# Patient Record
Sex: FEMALE | Race: WHITE
[De-identification: names, ages, dates, MRNs, and addresses within clinical notes are randomized per-mention and may not be internally consistent; named-entity substitution may affect disease eponyms.]

---

## 2018-04-12 ENCOUNTER — HOSPITAL ENCOUNTER (EMERGENCY)
Dept: HOSPITAL 80 - FED | Age: 28
Discharge: HOME | End: 2018-04-12
Payer: COMMERCIAL

## 2018-04-12 VITALS — DIASTOLIC BLOOD PRESSURE: 72 MMHG | SYSTOLIC BLOOD PRESSURE: 125 MMHG

## 2018-04-12 DIAGNOSIS — K42.9: ICD-10-CM

## 2018-04-12 DIAGNOSIS — K83.9: Primary | ICD-10-CM

## 2018-04-12 DIAGNOSIS — E86.9: ICD-10-CM

## 2018-04-12 DIAGNOSIS — K59.00: ICD-10-CM

## 2018-04-12 LAB — PLATELET # BLD: 360 10^3/UL (ref 150–400)

## 2018-04-12 NOTE — EDPHY
H & P


Stated Complaint: Middle back pain, starting at 1830, constricts abdomen


Source: Patient


Exam Limitations: No limitations





- Personal History


LMP (Females 10-55): 15-21 Days Ago


Current Tetanus Diphtheria and Acellular Pertussis (TDAP): Yes





- Medical/Surgical History


Hx Asthma: No


Hx Chronic Respiratory Disease: No


Hx Diabetes: No


Hx Cardiac Disease: No


Hx Renal Disease: No


Hx Cirrhosis: No


Hx Alcoholism: No


Hx HIV/AIDS: No


Hx Splenectomy or Spleen Trauma: No


Other PMH: denies





- Social History


Smoking Status: Never smoked


Time Seen by Provider: 04/12/18 20:31


HPI/ROS: 


HPI:  This is a 28-year-old female who presents with





Chief Complaint:  Epigastric and right upper quadrant pain





Location:  Epigastric and right upper quadrant


Quality:  Pain


Duration:  1 hr prior to arrival


Signs and Symptoms: no fever, + nausea, no vomiting, no hematemesis, no blood 

in stool, no abdominal bloating, no diarrhea, no back pain, no urinary symptoms

, no testicular/groin pain, no indigestion, no chest pain, no shortness of 

breath


Timing:  Acute


Severity: 7/10 


Context:  Patient reports that she had sudden onset of epigastric and right 

upper quadrant pain that radiated bilaterally into both flanks and then into 

her back that started approximately 1 hr prior to arrival.  She took a hot 

shower which actually made the pain worse.  She had just eaten a chicken fried 

steak that her sister had made approximately 2 hr prior to her arrival in the 

emergency room.  She reports that she has had similar occurrences of this type 

of pain approximately 4 times over the last year.  She reports that the pain 

would last for 15-30 minutes and then slowly resolve on its own.  She also had 

a chicken fried sandwich for lunch.  She reports nausea but no vomiting.  

Denies fever/diarrhea/urinary symptoms.  No history of kidney stones.  No 

recent heavy lifting or trauma.  No weakness/paresthesias.  Patient has not 

noticed any food intolerance or early satiety. 


Modifying Factors:  None





Comment: 








ROS: see HPI


Constitutional: No fever, no chills, no weight loss


Eyes:  No blurred vision


Respiratory:  No shortness of breath, no cough


Cardiovascular:  No chest pain, no palpitations


Gastrointestinal:  + nausea, no vomiting, no diarrhea, no hematemesis, no blood 

in stool 


Genitourinary:  No dysuria, no blood in urine 


Extremities:  No myalgias, no edema


Neurologic:  No weakness, no numbness


Skin:  No rashes, no petechiae


Hematologic:  No bruising, no bleeding





MEDICAL/SURGICAL/SOCIAL HISTORY: 


Medical history:  Generally healthy.  Does not take any regular medications.


Surgical history:  Denies


Social history:  Employed. 








CONSTITUTIONAL:  Obese pleasant nontoxic appearing adult white female, awake 

and alert, no obvious distress


HEENT: Atraumatic and normocephalic, PERRL, EOMI. Tympanic membranes clear. 

Oropharynx clear, no exudate and moist pink mucosa.  Airway patent.  No 

lymphadenopathy.  No meningismus.


Cardiovascular: Normal S1/S2, regular rate, regular rhythm, without murmur rub 

or gallop.


PULMONARY/CHEST:  Symmetrical and nontender. Clear to auscultation bilaterally. 

Good air movement. No accessory muscle usage.


ABDOMEN:  Soft, obesely round, right upper quadrant and epigastric moderate 

tenderness.  Positive Turner sign. nondistended, no rebound, no guarding, no 

peritoneal signs, no masses or organomegaly. No CVAT.


EXTREMITIES:  2/2 pulses, strength 5/5, no deformities, no clubbing, no 

cyanosis or edema.


NEUROLOGICAL: no focal neuro deficits.  GCS 15.


SKIN: Warm and dry, no erythema. no rash.  Good capillary refill. 


  


 (Kathe Zee)


Constitutional: 





 Initial Vital Signs











Temperature (C)  36.5 C   04/12/18 20:16


 


Heart Rate  88   04/12/18 20:16


 


Respiratory Rate  19   04/12/18 20:16


 


Blood Pressure  141/81 H  04/12/18 20:16


 


O2 Sat (%)  99   04/12/18 20:16








 











O2 Delivery Mode               Room Air














Allergies/Adverse Reactions: 


 





Sulfa (Sulfonamide Antibiotics) Allergy (Verified 04/12/18 20:15)


 








Home Medications: 














 Medication  Instructions  Recorded


 


Ondansetron Odt [Zofran Odt 4 mg 4 mg PO Q4 PRN #12 tab 04/12/18





(*)]  


 


Polyethylene Glycol 3350 [Miralax 17 gm PO DAILY #1 btl 04/12/18





17 gm (*)]  














Medical Decision Making





- Diagnostics


Imaging Results: 





 Imaging Impressions





Abdomen CT  04/12/18 20:53


Impression:


1.  Minimal gallbladder sludge, with no secondary findings to suggest acute 

cholecystitis.


2.  Possible mesenteric adenitis.


3.  Query an element of constipation, with some delay in small bowel transit 

resulting in fecalization of the distal small bowel.


4.  See above report for additional findings.


 


Results called and discussed with Kathe Zee PA-C, on April 12, 2018 at 2226.


 


 











ED Course/Re-evaluation: 


Labs, IV fluids, CT abdomen and pelvis scan, IV medications, urinalysis ordered


Gallbladder workup started. 


Given 1 L normal saline, IV Dilaudid, IV Zofran upon arrival


Vital signs reviewed and stable. 


2144:  Labs reviewed; WBC 12 K. No signs of ORLY/anemia/transaminitis


2228:  Called by Radiology who advised that CT abdomen and pelvis scan shows 

shows moderate stool burden primarily in the right colon, positive umbilical 

hernia small containing fat no signs of incarceration; there is some minimal 

gallbladder sludge but no signs of cholecystitis. 


Notified by nursing that patient is complaining of continued nausea; IV 

promethazine given





Discussion with patient regarding receiving Mag citrate tonight verses MiraLax 

for several days.  She has to go to work and school in the morning and prefers 

the latter option. 





Passed p.o. Trial prior to discharge. 





This patient was seen under the supervision of my secondary supervising 

physician.  I evaluated care for this patient independently.  Discussed this 

patient with Dr. Roy who did not see the patient.  


 (Kathe Zee)


Differential Diagnosis: 


Abdominal pain including but not limited to appendicitis, cholecystitis, 

gastritis and urinary tract infection.


 (Kathe Zee)


Other Provider: 





The patient was evaluated and managed by the Physician Assistant.  I discussed 

the patient's presentation and course with the midlevel provider with them and 

agree with the evaluation.  My co-signature indicates that I have reviewed this 

chart and I agree with the findings and plan of care as documented.  I am the 

secondary supervising physician. (Ninoska Roy)





- Data Points


Laboratory Results: 





 Laboratory Results





 04/12/18 21:21 





 04/12/18 21:06 





 











  04/12/18 04/12/18 04/12/18





  22:19 21:21 21:06


 


WBC    12.31 10^3/uL H 10^3/uL  





    (3.80-9.50)  


 


RBC    4.16 10^6/uL L 10^6/uL  





    (4.18-5.33)  


 


Hgb    12.6 g/dL g/dL  





    (12.6-16.3)  


 


Hct    37.0 % L %  





    (38.0-47.0)  


 


MCV    88.9 fL fL  





    (81.5-99.8)  


 


MCH    30.3 pg pg  





    (27.9-34.1)  


 


MCHC    34.1 g/dL g/dL  





    (32.4-36.7)  


 


RDW    12.1 % %  





    (11.5-15.2)  


 


Plt Count    360 10^3/uL 10^3/uL  





    (150-400)  


 


MPV    9.1 fL fL  





    (8.7-11.7)  


 


Neut % (Auto)    61.7 % %  





    (39.3-74.2)  


 


Lymph % (Auto)    27.1 % %  





    (15.0-45.0)  


 


Mono % (Auto)    8.2 % %  





    (4.5-13.0)  


 


Eos % (Auto)    2.1 % %  





    (0.6-7.6)  


 


Baso % (Auto)    0.5 % %  





    (0.3-1.7)  


 


Nucleat RBC Rel Count    0.0 % %  





    (0.0-0.2)  


 


Absolute Neuts (auto)    7.60 10^3/uL H 10^3/uL  





    (1.70-6.50)  


 


Absolute Lymphs (auto)    3.33 10^3/uL H 10^3/uL  





    (1.00-3.00)  


 


Absolute Monos (auto)    1.01 10^3/uL H 10^3/uL  





    (0.30-0.80)  


 


Absolute Eos (auto)    0.26 10^3/uL 10^3/uL  





    (0.03-0.40)  


 


Absolute Basos (auto)    0.06 10^3/uL 10^3/uL  





    (0.02-0.10)  


 


Absolute Nucleated RBC    0.00 10^3/uL 10^3/uL  





    (0-0.01)  


 


Immature Gran %    0.4 % %  





    (0.0-1.1)  


 


Immature Gran #    0.05 10^3/uL 10^3/uL  





    (0.00-0.10)  


 


Sodium      





    


 


Potassium      





    


 


Chloride      





    


 


Carbon Dioxide      





    


 


Anion Gap      





    


 


BUN      





    


 


Creatinine      





    


 


Estimated GFR      





    


 


Glucose      





    


 


Calcium      





    


 


Total Bilirubin      





    


 


Conjugated Bilirubin      





    


 


Unconjugated Bilirubin      





    


 


AST      





    


 


ALT      





    


 


Alkaline Phosphatase      





    


 


Total Protein      





    


 


Albumin      





    


 


Lipase      





    


 


Beta HCG, Qual      NEGATIVE 





    


 


Urine Color  YELLOW     





    


 


Urine Appearance  CLEAR     





    


 


Urine pH  5.0     





   (5.0-7.5)   


 


Ur Specific Gravity  > 1.035  H     





   (1.002-1.030)   


 


Urine Protein  NEGATIVE     





   (NEGATIVE)   


 


Urine Ketones  NEGATIVE     





   (NEGATIVE)   


 


Urine Blood  NEGATIVE     





   (NEGATIVE)   


 


Urine Nitrate  NEGATIVE     





   (NEGATIVE)   


 


Urine Bilirubin  NEGATIVE     





   (NEGATIVE)   


 


Urine Urobilinogen  NEGATIVE EU EU    





   (0.2-1.0)   


 


Ur Leukocyte Esterase  TRACE  H     





   (NEGATIVE)   


 


Urine RBC  1-3 /hpf /hpf    





   (0-3)   


 


Urine WBC  15-25 /hpf H /hpf    





   (0-3)   


 


Ur Epithelial Cells  TRACE /lpf /lpf    





   (NONE-1+)   


 


Urine Bacteria  TRACE /hpf H /hpf    





   (NONE SEEN)   


 


Urine Glucose  NEGATIVE     





   (NEGATIVE)   














  04/12/18





  21:06


 


WBC  





  


 


RBC  





  


 


Hgb  





  


 


Hct  





  


 


MCV  





  


 


MCH  





  


 


MCHC  





  


 


RDW  





  


 


Plt Count  





  


 


MPV  





  


 


Neut % (Auto)  





  


 


Lymph % (Auto)  





  


 


Mono % (Auto)  





  


 


Eos % (Auto)  





  


 


Baso % (Auto)  





  


 


Nucleat RBC Rel Count  





  


 


Absolute Neuts (auto)  





  


 


Absolute Lymphs (auto)  





  


 


Absolute Monos (auto)  





  


 


Absolute Eos (auto)  





  


 


Absolute Basos (auto)  





  


 


Absolute Nucleated RBC  





  


 


Immature Gran %  





  


 


Immature Gran #  





  


 


Sodium  141 mEq/L mEq/L





   (135-145) 


 


Potassium  4.1 mEq/L mEq/L





   (3.5-5.2) 


 


Chloride  105 mEq/L mEq/L





   () 


 


Carbon Dioxide  26 mEq/l mEq/l





   (22-31) 


 


Anion Gap  10 mEq/L mEq/L





   (8-16) 


 


BUN  15 mg/dL mg/dL





   (7-23) 


 


Creatinine  0.8 mg/dL mg/dL





   (0.6-1.0) 


 


Estimated GFR  > 60 





  


 


Glucose  89 mg/dL mg/dL





   () 


 


Calcium  9.4 mg/dL mg/dL





   (8.5-10.4) 


 


Total Bilirubin  0.4 mg/dL mg/dL





   (0.1-1.4) 


 


Conjugated Bilirubin  0.3 mg/dL mg/dL





   (0.0-0.5) 


 


Unconjugated Bilirubin  0.1 mg/dL mg/dL





   (0.0-1.1) 


 


AST  19 IU/L IU/L





   (14-46) 


 


ALT  32 IU/L IU/L





   (9-52) 


 


Alkaline Phosphatase  68 IU/L IU/L





   () 


 


Total Protein  7.8 g/dL g/dL





   (6.3-8.2) 


 


Albumin  4.3 g/dL g/dL





   (3.5-5.0) 


 


Lipase  92 IU/L IU/L





   () 


 


Beta HCG, Qual  





  


 


Urine Color  





  


 


Urine Appearance  





  


 


Urine pH  





  


 


Ur Specific Gravity  





  


 


Urine Protein  





  


 


Urine Ketones  





  


 


Urine Blood  





  


 


Urine Nitrate  





  


 


Urine Bilirubin  





  


 


Urine Urobilinogen  





  


 


Ur Leukocyte Esterase  





  


 


Urine RBC  





  


 


Urine WBC  





  


 


Ur Epithelial Cells  





  


 


Urine Bacteria  





  


 


Urine Glucose  





  











Medications Given: 





 








Discontinued Medications





Hydromorphone HCl (Dilaudid)  0.5 mg IVP EDNOW ONE


   Stop: 04/12/18 20:54


   Last Admin: 04/12/18 21:17 Dose:  0.5 mg


Sodium Chloride (Ns)  1,000 mls @ 0 mls/hr IV EDNOW ONE; Wide Open


   PRN Reason: Protocol


   Stop: 04/12/18 20:54


   Last Admin: 04/12/18 21:16 Dose:  1,000 mls


Ondansetron HCl (Zofran)  4 mg IVP EDNOW ONE


   Stop: 04/12/18 20:57


   Last Admin: 04/12/18 21:34 Dose:  4 mg


Ondansetron HCl (Zofran Odt 4 Mg Prepack#2)  1 btl TAKEHOME EDNOW ONE


   Stop: 04/12/18 22:33


   Last Admin: 04/12/18 22:49 Dose:  1 btl


Promethazine HCl (Phenergan)  12.5 mg IVP ONCE ONE


   Stop: 04/12/18 22:29


   Last Admin: 04/12/18 22:30 Dose:  12.5 mg








Departure





- Departure


Disposition: Home, Routine, Self-Care


Clinical Impression: 


 Sludge in gallbladder, Umbilical hernia without obstruction and without 

gangrene, Constipation





Condition: Good


Instructions:  Ondansetron (By mouth), Constipation (ED), Biliary Colic (ED), 

Umbilical Hernia (ED)


Additional Instructions: 


Consume a minimum of 8-10 glasses of water or electrolyte fluid replacement 

drinks that include Gatorade, Powerade, Pedialyte. 


Eat a bland diet for the next 48 hours and then slowly advance as tolerated. 


Take MiraLax mixed with bottle of Gatorade daily for the next 7 days and then 

daily as needed for constipation. 


Please follow gallbladder/low-fat diet to prevent biliary colic.  


Return to the Emergency Room if symptoms do not resolve in the next 48-72 hours

, you spike a fever > 102  F, or experience intractable abdominal pain/nausea/

vomiting. 


Referrals: 


Minor Santoyo [Primary Care Provider] - As per Instructions


Prescriptions: 


Ondansetron Odt [Zofran Odt 4 mg (*)] 4 mg PO Q4 PRN #12 tab


 PRN Reason: Nausea/Vomiting, Use 1st


Polyethylene Glycol 3350 [Miralax 17 gm (*)] 17 gm PO DAILY #1 btl

## 2018-07-06 ENCOUNTER — HOSPITAL ENCOUNTER (OUTPATIENT)
Dept: HOSPITAL 80 - FIMAGING | Age: 28
End: 2018-07-06
Attending: FAMILY MEDICINE
Payer: COMMERCIAL

## 2018-07-06 DIAGNOSIS — K82.8: Primary | ICD-10-CM

## 2018-07-06 PROCEDURE — A9537 TC99M MEBROFENIN: HCPCS

## 2018-07-06 PROCEDURE — 78227 HEPATOBIL SYST IMAGE W/DRUG: CPT

## 2018-11-06 ENCOUNTER — HOSPITAL ENCOUNTER (OUTPATIENT)
Dept: HOSPITAL 80 - CLAB | Age: 28
End: 2018-11-06
Attending: PHYSICAL MEDICINE & REHABILITATION
Payer: COMMERCIAL

## 2018-11-06 DIAGNOSIS — M79.672: Primary | ICD-10-CM

## 2018-11-06 DIAGNOSIS — M77.32: ICD-10-CM
